# Patient Record
Sex: FEMALE
[De-identification: names, ages, dates, MRNs, and addresses within clinical notes are randomized per-mention and may not be internally consistent; named-entity substitution may affect disease eponyms.]

---

## 2017-08-15 ENCOUNTER — APPOINTMENT (OUTPATIENT)
Dept: DERMATOLOGY | Facility: CLINIC | Age: 67
End: 2017-08-15
Payer: MEDICARE

## 2017-08-15 VITALS
HEIGHT: 62 IN | DIASTOLIC BLOOD PRESSURE: 74 MMHG | SYSTOLIC BLOOD PRESSURE: 122 MMHG | BODY MASS INDEX: 28.71 KG/M2 | WEIGHT: 156 LBS

## 2017-08-15 DIAGNOSIS — L81.4 OTHER MELANIN HYPERPIGMENTATION: ICD-10-CM

## 2017-08-15 DIAGNOSIS — L82.1 OTHER SEBORRHEIC KERATOSIS: ICD-10-CM

## 2017-08-15 DIAGNOSIS — I86.8 VARICOSE VEINS OF OTHER SPECIFIED SITES: ICD-10-CM

## 2017-08-15 DIAGNOSIS — D22.9 MELANOCYTIC NEVI, UNSPECIFIED: ICD-10-CM

## 2017-08-15 DIAGNOSIS — Z87.39 PERSONAL HISTORY OF OTHER DISEASES OF THE MUSCULOSKELETAL SYSTEM AND CONNECTIVE TISSUE: ICD-10-CM

## 2017-08-15 DIAGNOSIS — L72.0 EPIDERMAL CYST: ICD-10-CM

## 2017-08-15 DIAGNOSIS — Z12.83 ENCOUNTER FOR SCREENING FOR MALIGNANT NEOPLASM OF SKIN: ICD-10-CM

## 2017-08-15 PROCEDURE — 10060 I&D ABSCESS SIMPLE/SINGLE: CPT | Mod: GC

## 2017-08-15 PROCEDURE — 99203 OFFICE O/P NEW LOW 30 MIN: CPT | Mod: 25,GC

## 2017-08-15 RX ORDER — ESTRADIOL 0.1 MG/G
0.1 CREAM VAGINAL
Qty: 42 | Refills: 0 | Status: ACTIVE | COMMUNITY
Start: 2017-03-13

## 2017-08-15 RX ORDER — SIMVASTATIN 20 MG/1
20 TABLET, FILM COATED ORAL
Refills: 0 | Status: ACTIVE | COMMUNITY

## 2017-08-17 ENCOUNTER — TRANSCRIPTION ENCOUNTER (OUTPATIENT)
Age: 67
End: 2017-08-17

## 2021-02-04 ENCOUNTER — FORM ENCOUNTER (OUTPATIENT)
Age: 71
End: 2021-02-04

## 2021-03-24 ENCOUNTER — FORM ENCOUNTER (OUTPATIENT)
Age: 71
End: 2021-03-24

## 2021-03-25 ENCOUNTER — APPOINTMENT (OUTPATIENT)
Dept: OBGYN | Facility: CLINIC | Age: 71
End: 2021-03-25
Payer: MEDICARE

## 2021-03-25 ENCOUNTER — RESULT REVIEW (OUTPATIENT)
Age: 71
End: 2021-03-25

## 2021-03-25 ENCOUNTER — FORM ENCOUNTER (OUTPATIENT)
Age: 71
End: 2021-03-25

## 2021-03-25 PROCEDURE — G0101: CPT

## 2021-04-29 ENCOUNTER — FORM ENCOUNTER (OUTPATIENT)
Age: 71
End: 2021-04-29

## 2021-05-11 ENCOUNTER — FORM ENCOUNTER (OUTPATIENT)
Age: 71
End: 2021-05-11

## 2021-05-12 ENCOUNTER — FORM ENCOUNTER (OUTPATIENT)
Age: 71
End: 2021-05-12

## 2021-06-03 ENCOUNTER — FORM ENCOUNTER (OUTPATIENT)
Age: 71
End: 2021-06-03

## 2021-08-15 ENCOUNTER — FORM ENCOUNTER (OUTPATIENT)
Age: 71
End: 2021-08-15

## 2021-08-17 ENCOUNTER — FORM ENCOUNTER (OUTPATIENT)
Age: 71
End: 2021-08-17

## 2022-08-16 ENCOUNTER — APPOINTMENT (OUTPATIENT)
Dept: OBGYN | Facility: CLINIC | Age: 72
End: 2022-08-16

## 2022-08-16 VITALS
HEART RATE: 76 BPM | OXYGEN SATURATION: 97 % | SYSTOLIC BLOOD PRESSURE: 140 MMHG | DIASTOLIC BLOOD PRESSURE: 80 MMHG | HEIGHT: 62 IN | BODY MASS INDEX: 28.16 KG/M2 | RESPIRATION RATE: 14 BRPM | WEIGHT: 153 LBS

## 2022-08-16 DIAGNOSIS — Z12.31 ENCOUNTER FOR SCREENING MAMMOGRAM FOR MALIGNANT NEOPLASM OF BREAST: ICD-10-CM

## 2022-08-16 DIAGNOSIS — R92.2 INCONCLUSIVE MAMMOGRAM: ICD-10-CM

## 2022-08-16 DIAGNOSIS — N81.9 FEMALE GENITAL PROLAPSE, UNSPECIFIED: ICD-10-CM

## 2022-08-16 DIAGNOSIS — Z01.419 ENCOUNTER FOR GYNECOLOGICAL EXAMINATION (GENERAL) (ROUTINE) W/OUT ABNORMAL FINDINGS: ICD-10-CM

## 2022-08-16 PROCEDURE — G0101: CPT

## 2022-08-16 NOTE — PLAN
[FreeTextEntry1] : HCM\par -SBE\par -pap & HPV today\par -Rx mammo/sono given\par -MVI, Calcium, Vit d\par -Weight/exercise\par \par Genital prolapse\par -continue pessary, pt. does pessary care herself\par \par RTO 1 year\par

## 2022-08-16 NOTE — HISTORY OF PRESENT ILLNESS
[TextBox_4] : 73yo  presents for routine gyn exam.  No complaints. Pt. with genital prolapse and uses pessary.  Pt. takes care of pessary herself and removes and cleans it every night.\par \par Info. from prior EMR:\par OB History:  x 2, TOP x 1\par GYN\par uterine prolapse (uses pessary) Sexually Active  second marriage\par PMH\par Cholesterol\par Surgical History: T+A\par Allergies: NKDA\par \par Allergies\par Current Medications: Prescribed/Suppliments/OTC simvastatin, crestor\par Last PAP: 2019 -- wnl Last Mammo: 2019 Last Dexa: 2019 Last Colonoscopy: \par \par Social History\par Patient nonsmoker and has no familial exposure to second hand smoke\par Smoker Status: Never smoker Advance Directives Discussed\par Fam HX comments: father- lung ca (smoker), sister- Von Rocklinghuasen disease\par \par DVT\par Personal history of blood clots/DVT/PE: no\par Personal history of conditions causing increased risk of blood clots/DVT/PE: no\par Family history of blood clots/DVT/PE: no\par Family history of conditions causing increased risk of blood clots/DVT/PE: no\par  [PapSmeardate] : 3/2021 [TextBox_31] : ASCUS, HPV negative [BoneDensityDate] : 5/2021 [TextBox_37] : wnl

## 2022-08-16 NOTE — PHYSICAL EXAM
[Chaperone Declined] : Patient declined chaperone [Appropriately responsive] : appropriately responsive [Alert] : alert [No Acute Distress] : no acute distress [No Lymphadenopathy] : no lymphadenopathy [Soft] : soft [Non-tender] : non-tender [Non-distended] : non-distended [No Lesions] : no lesions [No Mass] : no mass [Oriented x3] : oriented x3 [Examination Of The Breasts] : a normal appearance [No Masses] : no breast masses were palpable [Labia Majora] : normal [Labia Minora] : normal [Normal] : normal [Uterine Adnexae] : normal [FreeTextEntry5] : Resp. rate wnl, color pink, no SOB [Uterine Prolapse] : uterine prolapse [FreeTextEntry4] : pessary removed by pt, inspected, cleaned and replaced by me

## 2022-08-18 LAB — HPV HIGH+LOW RISK DNA PNL CVX: NOT DETECTED

## 2022-08-25 LAB — CYTOLOGY CVX/VAG DOC THIN PREP: NORMAL

## 2022-10-04 ENCOUNTER — NON-APPOINTMENT (OUTPATIENT)
Age: 72
End: 2022-10-04

## 2022-11-14 DIAGNOSIS — R92.1 MAMMOGRAPHIC CALCIFICATION FOUND ON DIAGNOSTIC IMAGING OF BREAST: ICD-10-CM

## 2024-08-20 ENCOUNTER — APPOINTMENT (OUTPATIENT)
Dept: OBGYN | Facility: CLINIC | Age: 74
End: 2024-08-20
Payer: MEDICARE

## 2024-08-20 VITALS
HEART RATE: 93 BPM | DIASTOLIC BLOOD PRESSURE: 100 MMHG | BODY MASS INDEX: 29.81 KG/M2 | WEIGHT: 162 LBS | SYSTOLIC BLOOD PRESSURE: 160 MMHG | HEIGHT: 62 IN | OXYGEN SATURATION: 98 % | TEMPERATURE: 97.8 F

## 2024-08-20 DIAGNOSIS — Z01.419 ENCOUNTER FOR GYNECOLOGICAL EXAMINATION (GENERAL) (ROUTINE) W/OUT ABNORMAL FINDINGS: ICD-10-CM

## 2024-08-20 DIAGNOSIS — M81.0 AGE-RELATED OSTEOPOROSIS W/OUT CURRENT PATHOLOGICAL FRACTURE: ICD-10-CM

## 2024-08-20 DIAGNOSIS — Z12.31 ENCOUNTER FOR SCREENING MAMMOGRAM FOR MALIGNANT NEOPLASM OF BREAST: ICD-10-CM

## 2024-08-20 DIAGNOSIS — Z11.51 ENCOUNTER FOR SCREENING FOR HUMAN PAPILLOMAVIRUS (HPV): ICD-10-CM

## 2024-08-20 PROCEDURE — G0101: CPT

## 2024-08-20 NOTE — PLAN
[FreeTextEntry1] : HCM -SBE -pap & HPV today -Rx mammo/dexa given -MVI, Calcium, Vit d -Weight/exercise -discussed office routine, patient portal and notification system  Genital prolapse -continue pessary (pessary care done by pt)  RTO 1 year

## 2024-08-20 NOTE — HISTORY OF PRESENT ILLNESS
[FreeTextEntry1] : 73yo  presents for routine gyn exam.  No complaints Pt. with genital prolapse and uses pessary. Pt. takes care of pessary herself and removes and cleans it every night.  <<<<<<<<<<<<<<<<<<<<<<<<<<<<<<<<<<<<<<<<<<<<<<<<<<<<<<<<<<<<<<<<<<<<<<<<<<<<<<<<<<<<<<<<<<<<<<<<<<<<<<<<<<<<<<<<<<<<<<<<<<<<<<<<<<<<<<<<<<<<<<<<<<<<<<<<<<<<<<Last exam  73yo  presents for routine gyn exam. No complaints. Pt. with genital prolapse and uses pessary. Pt. takes care of pessary herself and removes and cleans it every night.   Info. from prior EMR: OB History:  x 2, TOP x 1 GYN uterine prolapse (uses pessary) Sexually Active  second marriage PMH Cholesterol Surgical History: T+A Allergies: NKDA Current Medications: Prescribed/Suppliments/OTC simvastatin, crestor Last PAP: 2019 -- wnl Last Mammo: 2019 Last Dexa:  Last Colonoscopy:  Social History Patient nonsmoker and has no familial exposure to second hand smoke Smoker Status: Never smoker Advance Directives Discussed Fam HX comments: father- lung ca (smoker), sister- Von Rocklinghuasen disease Personal history of blood clots/DVT/PE: no Personal history of conditions causing increased risk of blood clots/DVT/PE: no Family history of blood clots/DVT/PE: no Family history of conditions causing increased risk of blood clots/DVT/PE: no PAP Smear: 3/2021, ASCUS, HPV negative.   Bone Density: 2021, wnl. [Mammogramdate] : 2022 [TextBox_19] : wnl [PapSmeardate] : 8/2022 [TextBox_31] : wnl [BoneDensityDate] : 2021 [TextBox_37] : wnl

## 2024-08-20 NOTE — PHYSICAL EXAM
[Chaperone Declined] : Patient declined chaperone [Appropriately responsive] : appropriately responsive [Alert] : alert [No Acute Distress] : no acute distress [No Lymphadenopathy] : no lymphadenopathy [Soft] : soft [Non-tender] : non-tender [Non-distended] : non-distended [No Lesions] : no lesions [No Mass] : no mass [Oriented x3] : oriented x3 [Examination Of The Breasts] : a normal appearance [No Masses] : no breast masses were palpable [Labia Majora] : normal [Labia Minora] : normal [Normal] : normal [Uterine Adnexae] : normal [FreeTextEntry4] : Color pink, no distress, [FreeTextEntry5] : Resp. rate wnl, color pink, no SOB [Uterine Prolapse] : uterine prolapse

## 2024-08-22 LAB — HPV HIGH+LOW RISK DNA PNL CVX: NOT DETECTED

## 2024-08-27 LAB — CYTOLOGY CVX/VAG DOC THIN PREP: NORMAL
